# Patient Record
Sex: MALE
[De-identification: names, ages, dates, MRNs, and addresses within clinical notes are randomized per-mention and may not be internally consistent; named-entity substitution may affect disease eponyms.]

---
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
## 2024-05-27 ENCOUNTER — EMERGENCY (EMERGENCY)
Facility: HOSPITAL | Age: 19
LOS: 1 days | Discharge: HOME | End: 2024-05-27
Payer: SELF-PAY

## 2024-05-27 VITALS
HEIGHT: 69 IN | WEIGHT: 169.98 LBS | OXYGEN SATURATION: 98 % | RESPIRATION RATE: 17 BRPM | SYSTOLIC BLOOD PRESSURE: 141 MMHG | HEART RATE: 66 BPM | DIASTOLIC BLOOD PRESSURE: 86 MMHG | TEMPERATURE: 98 F

## 2024-05-27 PROCEDURE — 71100 X-RAY EXAM RIBS UNI 2 VIEWS: CPT | Mod: 26

## 2024-05-27 PROCEDURE — 99284 EMERGENCY DEPT VISIT MOD MDM: CPT | Mod: 25

## 2024-05-27 PROCEDURE — 99284 EMERGENCY DEPT VISIT MOD MDM: CPT

## 2024-05-27 PROCEDURE — 71045 X-RAY EXAM CHEST 1 VIEW: CPT | Mod: 26,59

## 2024-05-27 PROCEDURE — 71100 X-RAY EXAM RIBS UNI 2 VIEWS: CPT

## 2024-05-27 PROCEDURE — 71045 X-RAY EXAM CHEST 1 VIEW: CPT

## 2024-05-27 RX ORDER — LIDOCAINE 4 G/100G
1 CREAM TOPICAL ONCE
Refills: 0 | Status: COMPLETED | OUTPATIENT
Start: 2024-05-27 | End: 2024-05-27

## 2024-05-27 RX ORDER — IBUPROFEN 200 MG
400 TABLET ORAL ONCE
Refills: 0 | Status: COMPLETED | OUTPATIENT
Start: 2024-05-27 | End: 2024-05-27

## 2024-05-27 RX ORDER — ACETAMINOPHEN 500 MG
650 TABLET ORAL ONCE
Refills: 0 | Status: COMPLETED | OUTPATIENT
Start: 2024-05-27 | End: 2024-05-27

## 2024-05-27 RX ADMIN — Medication 650 MILLIGRAM(S): at 14:43

## 2024-05-27 RX ADMIN — LIDOCAINE 1 PATCH: 4 CREAM TOPICAL at 16:39

## 2024-05-27 NOTE — ED PROVIDER NOTE - NSFOLLOWUPINSTRUCTIONS_ED_ALL_ED_FT
You were seen in the ED after an injury to your chest.    Take Tylenol and/or Ibuprofen every 4-6 hours as needed for pain.    Apply a lidocaine patch 12 hours on 12 hours off for pain control if needed.    Avoid heavy lifting until you are starting to feel better. Exercise as tolerated.     ***Return to the ED if you have any new or worsening symptoms such as  difficulty breathing, severe pain, or any other concerning symptoms***

## 2024-05-27 NOTE — ED PROVIDER NOTE - CLINICAL SUMMARY MEDICAL DECISION MAKING FREE TEXT BOX
Patient is a 19y M no pMhx p/w right sided pain after injury. Patient is a , was cleaning pool when umbrella gordo hit him on his right side. Will get XRays r/o rib fracture. Bilateral breath sounds present, breathing comfortably, low suspicion for pneumothorax.

## 2024-05-27 NOTE — ED PROVIDER NOTE - OBJECTIVE STATEMENT
Patient is a 19y M no pMhx p/w right sided pain after injury. Patient is a 19y M no pMhx p/w right sided pain after injury. Patient is a , was cleaning pool when umbrella gordo hit him on his right side. Patient states he now has pain in the area. Denies SOB, vomiting, syncope, abdominal pain.

## 2024-05-27 NOTE — ED PROVIDER NOTE - PATIENT PORTAL LINK FT
You can access the FollowMyHealth Patient Portal offered by [facility] by registering at the following website: [email or web address]. By joining [email or web address]’s FollowMyHealth portal, you will also be able to view your health information using other applications (apps) compatible with our system.

## 2024-05-27 NOTE — ED PROVIDER NOTE - PROGRESS NOTE DETAILS
Kandice Mesa- pt stable. tolerating po. no fx on XR. discussed results and plan to dc. pt agrees. return precautions given.

## 2024-05-27 NOTE — ED ADULT NURSE NOTE - OBJECTIVE STATEMENT
19y Male AOx4 with no PMH presents to the ED c/o R rib pain. Pt states he was cleaning a pool when an umbrella gordo hit him on his R side. Denies head strike, LOC or ac use.  Denies N/V, fever/chills, SOB, chest pain. Spontaneous/unlabored respirations, speaking in full sentences. Side rails up, bed in lowest position,  safety maintained.
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
## 2024-05-27 NOTE — ED PROVIDER NOTE - PHYSICAL EXAMINATION
GENERAL: no acute distress, non-toxic appearing  HEAD: normocephalic, atraumatic  HEENT: PERRLA, EOMI, normal conjunctiva  CARDIAC: regular rate and rhythm, no appreciable murmurs  CHEST: [place] to right lower anterior chest, tender to palpation  PULM: clear to ascultation bilaterally, no crackles, rales, rhonchi, or wheezing  NEURO: alert and oriented x 3, normal speech, no gross neurologic deficit  MSK: no visible deformities  PSYCH: appropriate mood and affect

## 2024-09-11 NOTE — ED ADULT NURSE NOTE - HOW OFTEN DO YOU HAVE A DRINK CONTAINING ALCOHOL?
Addendum  created 09/11/24 1401 by ANAHY Bravo-CRNA    Review and Sign - Ready for Procedure       Never